# Patient Record
Sex: MALE | Race: WHITE | NOT HISPANIC OR LATINO | Employment: FULL TIME | URBAN - NONMETROPOLITAN AREA
[De-identification: names, ages, dates, MRNs, and addresses within clinical notes are randomized per-mention and may not be internally consistent; named-entity substitution may affect disease eponyms.]

---

## 2023-09-13 ENCOUNTER — TELEPHONE (OUTPATIENT)
Dept: UROLOGY | Facility: CLINIC | Age: 48
End: 2023-09-13

## 2023-09-13 NOTE — TELEPHONE ENCOUNTER
Provider: DR. PALACIOS    Caller: Desmond Umana    Relationship to Patient: Self     Phone Number: [phone]    Reason for Call: CANCEL APPOINTMENT    When was the patient last seen: NEW PATIENT    Notes: MR. UMANA IS UNABLE TO MAKE TOMORROW'S APPOINTMENT (09/14/23 @ 1:50 PM) DUE TO A CHANGE IN HIS WORK SCHEDULE. PATIENT WILL CALL BACK TO RESCHEDULE ONCE HE HAS THE UPDATED WORK SCHEDULE.